# Patient Record
Sex: FEMALE | Race: AMERICAN INDIAN OR ALASKA NATIVE
[De-identification: names, ages, dates, MRNs, and addresses within clinical notes are randomized per-mention and may not be internally consistent; named-entity substitution may affect disease eponyms.]

---

## 2019-04-04 ENCOUNTER — HOSPITAL ENCOUNTER (EMERGENCY)
Dept: HOSPITAL 31 - C.ER | Age: 39
Discharge: HOME | End: 2019-04-04
Payer: MEDICAID

## 2019-04-04 VITALS
OXYGEN SATURATION: 100 % | HEART RATE: 74 BPM | SYSTOLIC BLOOD PRESSURE: 133 MMHG | DIASTOLIC BLOOD PRESSURE: 86 MMHG | RESPIRATION RATE: 18 BRPM | TEMPERATURE: 98 F

## 2019-04-04 DIAGNOSIS — Z00.8: Primary | ICD-10-CM

## 2019-04-04 NOTE — C.PDOC
History Of Present Illness


Patient is  a 38 year old female with no past medical history, who presents with

complaints of two bumps on her head that she noticed 3 days ago. She was feeling

her head and noticed two firm lumps on each side of her head. She denies trauma,

discharge, tenderness/pain, swelling, rashes, bruising, changes in vision, 

headaches, dizziness, nausea, vomiting, difficulty walking, loss of balance. She

has no other complaints and otherwise feels well. Of note, patient states her 

mother was diagnosed with a benign brain tumor, so she is concerned she has a 

tumor as well and wants to get checked.





PMHx:denies


SurgHx: BTL 2017


FamHx: Mother- Benign brain tumor, CVA, DM


SocHx: smokes 6ciggarettes daily x10 years; denies alcohol and drug use


Allergies: NKDA


Meds: denies


Time Seen by Provider: 04/04/19 09:37


Chief Complaint (Nursing): Abnormal Skin Integrity





Past Medical History


Vital Signs: 





                                Last Vital Signs











Temp  98 F   04/04/19 09:33


 


Pulse  74   04/04/19 09:33


 


Resp  18   04/04/19 09:33


 


BP  133/86   04/04/19 09:33


 


Pulse Ox  100   04/04/19 09:33











Family History: States: Stroke, Diabetes





- Social History


Hx Alcohol Use: No


Hx Substance Use: No





- Immunization History


Hx Influenza Vaccination: Yes


Hx Pneumococcal Vaccination: No





Review Of Systems


Constitutional: Negative for: Fever, Chills, Weakness


Eyes: Negative for: Vision Change


ENT: Negative for: Ear Pain, Ear Discharge, Nose Congestion


Cardiovascular: Negative for: Chest Pain, Palpitations, Light Headedness


Gastrointestinal: Negative for: Nausea, Vomiting


Genitourinary: Negative for: Dysuria


Musculoskeletal: Negative for: Neck Pain


Skin: Negative for: Rash


Neurological: Negative for: Weakness, Numbness, Incoordination, Change in 

Speech, Altered Mental Status, Headache, Dizziness





Physical Exam





- Physical Exam


Appears: Well, Non-toxic, No Acute Distress


Skin: Normal Color, Warm, Dry, No Rash


Head: Atraumatic, Normacephalic, No Tenderness, No Swelling, No Abrasion, No 

Laceration, Other (normal skull anatomy)


Eye(s): bilateral: Normal Inspection, EOMI


Neck: Normal, Normal ROM, No Midline Cervical Tenderness, No Paracervical 

Tenderness


Lymphatic: Normal Exam, No Adenopathy


Neurological/Psych: Oriented x3, Normal Speech, Normal Cognition, Normal Motor, 

Normal Sensation





ED Course And Treatment


O2 Sat by Pulse Oximetry: 100





Medical Decision Making


Medical Decision Making: 


Patient examined- normal skull anatomy; no lesions, bruising, rashes, swelling, 

tenderness, masses, lacerations, abrasions, changes in skin integrity. Patient 

is clear and stable for discharge to home and was instructed to follow up with 

PMD within 1-2 days for continued care.





Disposition





- Disposition


Disposition: HOME/ ROUTINE


Disposition Time: 09:58


Condition: STABLE


Additional Instructions: 


Please follow up with your PMD for continued care within 1-2 days of discharge. 

If you develop worsening symptoms, return to the nearest ER or your PMD.


Forms:  [facility] [facility] (English)





- Clinical Impression


Clinical Impression: 


 Normal exam